# Patient Record
Sex: MALE | Race: WHITE | HISPANIC OR LATINO | Employment: FULL TIME | URBAN - METROPOLITAN AREA
[De-identification: names, ages, dates, MRNs, and addresses within clinical notes are randomized per-mention and may not be internally consistent; named-entity substitution may affect disease eponyms.]

---

## 2017-09-01 ENCOUNTER — HOSPITAL ENCOUNTER (EMERGENCY)
Facility: MEDICAL CENTER | Age: 27
End: 2017-09-01
Attending: EMERGENCY MEDICINE
Payer: OTHER MISCELLANEOUS

## 2017-09-01 ENCOUNTER — APPOINTMENT (OUTPATIENT)
Dept: RADIOLOGY | Facility: MEDICAL CENTER | Age: 27
End: 2017-09-01
Attending: EMERGENCY MEDICINE
Payer: OTHER MISCELLANEOUS

## 2017-09-01 VITALS
HEART RATE: 76 BPM | OXYGEN SATURATION: 99 % | HEIGHT: 75 IN | BODY MASS INDEX: 27.69 KG/M2 | WEIGHT: 222.66 LBS | TEMPERATURE: 97.8 F | RESPIRATION RATE: 18 BRPM | DIASTOLIC BLOOD PRESSURE: 71 MMHG | SYSTOLIC BLOOD PRESSURE: 135 MMHG

## 2017-09-01 DIAGNOSIS — F41.8 SITUATIONAL ANXIETY: ICD-10-CM

## 2017-09-01 DIAGNOSIS — S16.1XXA CERVICAL STRAIN, INITIAL ENCOUNTER: ICD-10-CM

## 2017-09-01 DIAGNOSIS — S80.02XA CONTUSION OF LEFT KNEE, INITIAL ENCOUNTER: ICD-10-CM

## 2017-09-01 DIAGNOSIS — S09.90XA CLOSED HEAD INJURY, INITIAL ENCOUNTER: ICD-10-CM
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
## 2017-09-01 PROCEDURE — 73564 X-RAY EXAM KNEE 4 OR MORE: CPT | Mod: LT

## 2017-09-01 PROCEDURE — 700102 HCHG RX REV CODE 250 W/ 637 OVERRIDE(OP): Performed by: EMERGENCY MEDICINE

## 2017-09-01 PROCEDURE — 99284 EMERGENCY DEPT VISIT MOD MDM: CPT

## 2017-09-01 PROCEDURE — 72125 CT NECK SPINE W/O DYE: CPT

## 2017-09-01 PROCEDURE — 70450 CT HEAD/BRAIN W/O DYE: CPT

## 2017-09-01 PROCEDURE — A9270 NON-COVERED ITEM OR SERVICE: HCPCS | Performed by: EMERGENCY MEDICINE

## 2017-09-01 RX ORDER — IBUPROFEN 200 MG
200-400 TABLET ORAL EVERY 6 HOURS PRN
Status: SHIPPED | COMMUNITY
End: 2018-02-07

## 2017-09-01 RX ORDER — HYDROCODONE BITARTRATE AND ACETAMINOPHEN 5; 325 MG/1; MG/1
1 TABLET ORAL ONCE
Status: COMPLETED | OUTPATIENT
Start: 2017-09-01 | End: 2017-09-01

## 2017-09-01 RX ORDER — HYDROCODONE BITARTRATE AND ACETAMINOPHEN 5; 325 MG/1; MG/1
1-2 TABLET ORAL EVERY 6 HOURS PRN
Qty: 15 TAB | Refills: 0 | Status: SHIPPED | OUTPATIENT
Start: 2017-09-01 | End: 2018-02-07

## 2017-09-01 RX ADMIN — HYDROCODONE BITARTRATE AND ACETAMINOPHEN 1 TABLET: 5; 325 TABLET ORAL at 13:51

## 2017-09-01 ASSESSMENT — PAIN SCALES - GENERAL: PAINLEVEL_OUTOF10: 5
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
## 2017-09-01 NOTE — ED NOTES
The Medication Reconciliation process has been completed by interviewing the patient    Allergies have been reviewed  Antibiotic use in 30 days - none

## 2018-02-07 ENCOUNTER — OFFICE VISIT (OUTPATIENT)
Dept: MEDICAL GROUP | Facility: MEDICAL CENTER | Age: 28
End: 2018-02-07
Payer: COMMERCIAL

## 2018-02-07 VITALS
HEIGHT: 72 IN | BODY MASS INDEX: 29.34 KG/M2 | DIASTOLIC BLOOD PRESSURE: 78 MMHG | RESPIRATION RATE: 12 BRPM | WEIGHT: 216.6 LBS | OXYGEN SATURATION: 98 % | HEART RATE: 100 BPM | TEMPERATURE: 98 F | SYSTOLIC BLOOD PRESSURE: 120 MMHG

## 2018-02-07 DIAGNOSIS — Z11.3 SCREEN FOR STD (SEXUALLY TRANSMITTED DISEASE): ICD-10-CM

## 2018-02-07 DIAGNOSIS — Z00.00 ANNUAL PHYSICAL EXAM: ICD-10-CM

## 2018-02-07 PROCEDURE — 99385 PREV VISIT NEW AGE 18-39: CPT | Performed by: NURSE PRACTITIONER

## 2018-02-07 ASSESSMENT — PATIENT HEALTH QUESTIONNAIRE - PHQ9: CLINICAL INTERPRETATION OF PHQ2 SCORE: 0
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
## 2018-02-10 ENCOUNTER — HOSPITAL ENCOUNTER (OUTPATIENT)
Dept: LAB | Facility: MEDICAL CENTER | Age: 28
End: 2018-02-10
Attending: NURSE PRACTITIONER
Payer: COMMERCIAL

## 2018-02-10 DIAGNOSIS — Z11.3 SCREEN FOR STD (SEXUALLY TRANSMITTED DISEASE): ICD-10-CM

## 2018-02-10 DIAGNOSIS — Z00.00 ANNUAL PHYSICAL EXAM: ICD-10-CM

## 2018-02-10 LAB
ALBUMIN SERPL BCP-MCNC: 4.4 G/DL (ref 3.2–4.9)
ALBUMIN/GLOB SERPL: 1.3 G/DL
ALP SERPL-CCNC: 36 U/L (ref 30–99)
ALT SERPL-CCNC: 15 U/L (ref 2–50)
ANION GAP SERPL CALC-SCNC: 8 MMOL/L (ref 0–11.9)
AST SERPL-CCNC: 22 U/L (ref 12–45)
BILIRUB SERPL-MCNC: 0.6 MG/DL (ref 0.1–1.5)
BUN SERPL-MCNC: 10 MG/DL (ref 8–22)
C TRACH DNA SPEC QL NAA+PROBE: NEGATIVE
CALCIUM SERPL-MCNC: 9.7 MG/DL (ref 8.5–10.5)
CHLORIDE SERPL-SCNC: 106 MMOL/L (ref 96–112)
CHOLEST SERPL-MCNC: 186 MG/DL (ref 100–199)
CO2 SERPL-SCNC: 25 MMOL/L (ref 20–33)
CREAT SERPL-MCNC: 0.88 MG/DL (ref 0.5–1.4)
GLOBULIN SER CALC-MCNC: 3.3 G/DL (ref 1.9–3.5)
GLUCOSE SERPL-MCNC: 94 MG/DL (ref 65–99)
HCV AB SER QL: NEGATIVE
HDLC SERPL-MCNC: 51 MG/DL
LDLC SERPL CALC-MCNC: 124 MG/DL
N GONORRHOEA DNA SPEC QL NAA+PROBE: NEGATIVE
POTASSIUM SERPL-SCNC: 4.2 MMOL/L (ref 3.6–5.5)
PROT SERPL-MCNC: 7.7 G/DL (ref 6–8.2)
SODIUM SERPL-SCNC: 139 MMOL/L (ref 135–145)
SPECIMEN SOURCE: NORMAL
TRIGL SERPL-MCNC: 54 MG/DL (ref 0–149)

## 2018-02-10 PROCEDURE — 80053 COMPREHEN METABOLIC PANEL: CPT

## 2018-02-10 PROCEDURE — 87591 N.GONORRHOEAE DNA AMP PROB: CPT

## 2018-02-10 PROCEDURE — 86803 HEPATITIS C AB TEST: CPT

## 2018-02-10 PROCEDURE — 80061 LIPID PANEL: CPT

## 2018-02-10 PROCEDURE — 36415 COLL VENOUS BLD VENIPUNCTURE: CPT

## 2018-02-10 PROCEDURE — 87491 CHLMYD TRACH DNA AMP PROBE: CPT

## 2019-03-13 ENCOUNTER — OFFICE VISIT (OUTPATIENT)
Dept: MEDICAL GROUP | Facility: MEDICAL CENTER | Age: 29
End: 2019-03-13
Payer: COMMERCIAL

## 2019-03-13 ENCOUNTER — APPOINTMENT (OUTPATIENT)
Dept: LAB | Facility: MEDICAL CENTER | Age: 29
End: 2019-03-13
Attending: NURSE PRACTITIONER
Payer: COMMERCIAL

## 2019-03-13 VITALS
TEMPERATURE: 96.2 F | SYSTOLIC BLOOD PRESSURE: 132 MMHG | RESPIRATION RATE: 16 BRPM | OXYGEN SATURATION: 94 % | WEIGHT: 234 LBS | BODY MASS INDEX: 31.69 KG/M2 | DIASTOLIC BLOOD PRESSURE: 90 MMHG | HEART RATE: 122 BPM | HEIGHT: 72 IN

## 2019-03-13 DIAGNOSIS — J11.1 INFLUENZA-LIKE ILLNESS: ICD-10-CM

## 2019-03-13 DIAGNOSIS — Z83.49 FAMILY HISTORY THYROID DISEASE IN BROTHER: ICD-10-CM

## 2019-03-13 LAB
FLUAV+FLUBV AG SPEC QL IA: NEGATIVE
INT CON NEG: NEGATIVE
INT CON POS: POSITIVE

## 2019-03-13 PROCEDURE — 87804 INFLUENZA ASSAY W/OPTIC: CPT | Performed by: NURSE PRACTITIONER

## 2019-03-13 PROCEDURE — 99214 OFFICE O/P EST MOD 30 MIN: CPT | Performed by: NURSE PRACTITIONER

## 2019-03-13 ASSESSMENT — PATIENT HEALTH QUESTIONNAIRE - PHQ9: CLINICAL INTERPRETATION OF PHQ2 SCORE: 0

## 2019-03-13 NOTE — PROGRESS NOTES
Subjective:     Chief Complaint   Patient presents with   • Pharyngitis     X3 DAYS    • Chills     SHIVERING AT NIGHT    • Other     DOCTORS NOTE FOR WORK      Ector Rodriguez is a 28 y.o. male here for evaluation of sore throat, chills, cough, congestion. Symptoms started 2 days ago, he has had chills nightly and productive cough. Throat hurts with swallowing, appetite is decreased. He is missing work.  No sob, otalgia, nausea, vomiting, rash. He is taking nyquil with short term relief    Brother recently diagnosed with hypothyroid, requesting screening. No weight change, fatigue, hair loss, heat or cold intolerance    No problem-specific Assessment & Plan notes found for this encounter.       Current medicines (including changes today)  No current outpatient prescriptions on file.     No current facility-administered medications for this visit.      He  has no past medical history of Asthma; Diabetes (HCC); Hyperlipidemia; or Hypertension.    ROS included above     Objective:     Blood pressure 132/90, pulse (!) 122, temperature (!) 35.7 °C (96.2 °F), temperature source Temporal, resp. rate 16, height 1.829 m (6'), weight 106.1 kg (234 lb), SpO2 94 %. Body mass index is 31.74 kg/m².     Physical Exam:  General: Alert, oriented in no acute distress.  Eye contact is good, speech is normal, affect calm  HEENT: posterior pharynx with mild erythema, no tonsillar enlargement, no lesions. TMs gray with good landmarks bilaterally. No lymphadenopathy.  Lungs: clear to auscultation bilaterally, normal effort, no wheeze/ rhonchi/ rales.  CV: regular rate and rhythm, S1, S2, no murmur  Ext: no edema, color normal, vascularity normal, temperature normal    Assessment and Plan:   The following treatment plan was discussed   1. Influenza-like illness  Negative for influenza. Well appearing in the office. Course of viral illness reviewed. Symptomatic treatment discussed including increasing fluids, rest, humidity, OTC analgesic  as needed.  Letter provided to excuse work absence  POCT Influenza A/B   2. Family history thyroid disease in brother  TSH WITH REFLEX TO FT4       Followup: as needed       Please note that this dictation was created using voice recognition software. I have worked with consultants from the vendor as well as technical experts from [facility] to optimize the interface. I have made every reasonable attempt to correct obvious errors, but I expect that there are errors of grammar and possibly content that I did not discover before finalizing the note.

## 2019-03-13 NOTE — LETTER
March 13, 2019        RE: Ector Rodriguez    To whom it may concern;    Ector Rodriguez was seen today in my office for medical appointment.  Please excuse his work absence 3/12/19 through 3/15/19 due to illness.    Please contact me with any questions.    Sincerely,            Nikki KULKARNI

## 2020-01-17 ENCOUNTER — OFFICE VISIT (OUTPATIENT)
Dept: MEDICAL GROUP | Facility: MEDICAL CENTER | Age: 30
End: 2020-01-17
Payer: COMMERCIAL

## 2020-01-17 VITALS
BODY MASS INDEX: 30.46 KG/M2 | OXYGEN SATURATION: 95 % | SYSTOLIC BLOOD PRESSURE: 138 MMHG | HEIGHT: 75 IN | HEART RATE: 91 BPM | DIASTOLIC BLOOD PRESSURE: 74 MMHG | WEIGHT: 245 LBS | TEMPERATURE: 99.2 F

## 2020-01-17 DIAGNOSIS — M25.569 KNEE PAIN, UNSPECIFIED CHRONICITY, UNSPECIFIED LATERALITY: ICD-10-CM

## 2020-01-17 ASSESSMENT — PATIENT HEALTH QUESTIONNAIRE - PHQ9: CLINICAL INTERPRETATION OF PHQ2 SCORE: 0

## 2020-01-21 NOTE — PROGRESS NOTES
Patient comes in today for evaluation of knee pain.  He immediately tells me this is a work-related injury.  We have discussed process for proper evaluation through Workmen's Comp., his visit today will be canceled.  He will follow-up with primary care as needed.

## 2022-06-20 ENCOUNTER — HOSPITAL ENCOUNTER (OUTPATIENT)
Facility: MEDICAL CENTER | Age: 32
End: 2022-06-20
Attending: PHYSICIAN ASSISTANT
Payer: COMMERCIAL

## 2022-06-20 ENCOUNTER — OFFICE VISIT (OUTPATIENT)
Dept: URGENT CARE | Facility: CLINIC | Age: 32
End: 2022-06-20
Payer: COMMERCIAL

## 2022-06-20 VITALS
HEART RATE: 74 BPM | DIASTOLIC BLOOD PRESSURE: 58 MMHG | WEIGHT: 180 LBS | TEMPERATURE: 98.3 F | HEIGHT: 75 IN | RESPIRATION RATE: 18 BRPM | SYSTOLIC BLOOD PRESSURE: 112 MMHG | BODY MASS INDEX: 22.38 KG/M2 | OXYGEN SATURATION: 98 %

## 2022-06-20 DIAGNOSIS — R68.89 FLU-LIKE SYMPTOMS: ICD-10-CM

## 2022-06-20 DIAGNOSIS — Z20.822 CLOSE EXPOSURE TO COVID-19 VIRUS: ICD-10-CM

## 2022-06-20 LAB
FLUAV+FLUBV AG SPEC QL IA: NEGATIVE
INT CON NEG: NORMAL
INT CON POS: NORMAL

## 2022-06-20 PROCEDURE — 87804 INFLUENZA ASSAY W/OPTIC: CPT | Performed by: PHYSICIAN ASSISTANT

## 2022-06-20 PROCEDURE — U0003 INFECTIOUS AGENT DETECTION BY NUCLEIC ACID (DNA OR RNA); SEVERE ACUTE RESPIRATORY SYNDROME CORONAVIRUS 2 (SARS-COV-2) (CORONAVIRUS DISEASE [COVID-19]), AMPLIFIED PROBE TECHNIQUE, MAKING USE OF HIGH THROUGHPUT TECHNOLOGIES AS DESCRIBED BY CMS-2020-01-R: HCPCS

## 2022-06-20 PROCEDURE — U0005 INFEC AGEN DETEC AMPLI PROBE: HCPCS

## 2022-06-20 PROCEDURE — 99213 OFFICE O/P EST LOW 20 MIN: CPT | Mod: CS | Performed by: PHYSICIAN ASSISTANT

## 2022-06-20 ASSESSMENT — ENCOUNTER SYMPTOMS
FEVER: 1
DIARRHEA: 1

## 2022-06-20 NOTE — PROGRESS NOTES
"  Subjective:   Ector Rodriguez is a 31 y.o. male who presents today with   Chief Complaint   Patient presents with   • Coronavirus Screening     X3-4 days, SOB, fever, fatigue runny nose, diarrhea      Fever   This is a new problem. Episode onset: 3 days. The problem occurs constantly. The problem has been unchanged. Maximum temperature: subjective. Associated symptoms include diarrhea. Treatments tried: Advil cold and sinus. The treatment provided mild relief.   At home COVID negative.  Patient's fiancé tested positive for COVID.  PMH:  has no past medical history of Asthma, Diabetes (HCC), Hyperlipidemia, or Hypertension.  MEDS: No current outpatient medications on file.  ALLERGIES: No Known Allergies  SURGHX: History reviewed. No pertinent surgical history.  SOCHX:  reports that he has never smoked. He has never used smokeless tobacco. He reports current alcohol use. He reports that he does not use drugs.  FH: Reviewed with patient, not pertinent to this visit.     Review of Systems   Constitutional: Positive for fever and malaise/fatigue.   HENT:        Runny nose   Gastrointestinal: Positive for diarrhea.      Objective:   /58   Pulse 74   Temp 36.8 °C (98.3 °F) (Temporal)   Resp 18   Ht 1.905 m (6' 3\")   Wt 81.6 kg (180 lb)   SpO2 98%   BMI 22.50 kg/m²   Physical Exam  Vitals and nursing note reviewed.   Constitutional:       General: He is not in acute distress.     Appearance: Normal appearance. He is well-developed. He is not ill-appearing or toxic-appearing.   HENT:      Head: Normocephalic and atraumatic.      Right Ear: Hearing normal.      Left Ear: Hearing normal.   Eyes:      Pupils: Pupils are equal, round, and reactive to light.   Cardiovascular:      Rate and Rhythm: Normal rate.   Pulmonary:      Effort: Pulmonary effort is normal.   Musculoskeletal:      Comments: Normal movement in all 4 extremities   Skin:     General: Skin is warm and dry.   Neurological:      Mental " Status: He is alert.      Coordination: Coordination normal.     FLU -    Assessment/Plan:   Assessment    1. Flu-like symptoms  - POCT Influenza A/B  - SARS-CoV-2 PCR (24 hour In-House): Collect NP swab in VTM; Future    2. Close exposure to COVID-19 virus  Symptoms and presentation consistent with viral illness and we will rule out COVID at this time.  Vital signs are stable on exam today.  Discussed CDC guidelines including self isolation at home.   Patient encouraged to get plenty of rest, use OTC tylenol for pain/fever, and drink plenty of fluids.  Differential diagnosis, natural history, supportive care, and indications for immediate follow-up discussed.   Patient given instructions and understanding of medications and treatment.    If not improving in 3-5 days, F/U with PCP or return to  if symptoms worsen.    Patient agreeable to plan.      Please note that this dictation was created using voice recognition software. I have made every reasonable attempt to correct obvious errors, but I expect that there are errors of grammar and possibly content that I did not discover before finalizing the note.    Pool Monge PA-C

## 2022-06-21 DIAGNOSIS — R68.89 FLU-LIKE SYMPTOMS: ICD-10-CM

## 2022-06-21 LAB
COVID ORDER STATUS COVID19: NORMAL
SARS-COV-2 RNA RESP QL NAA+PROBE: NOTDETECTED
SPECIMEN SOURCE: NORMAL

## 2022-10-31 ENCOUNTER — OFFICE VISIT (OUTPATIENT)
Dept: MEDICAL GROUP | Facility: MEDICAL CENTER | Age: 32
End: 2022-10-31
Payer: COMMERCIAL

## 2022-10-31 ENCOUNTER — HOSPITAL ENCOUNTER (OUTPATIENT)
Dept: LAB | Facility: MEDICAL CENTER | Age: 32
End: 2022-10-31
Attending: STUDENT IN AN ORGANIZED HEALTH CARE EDUCATION/TRAINING PROGRAM
Payer: COMMERCIAL

## 2022-10-31 VITALS
SYSTOLIC BLOOD PRESSURE: 120 MMHG | TEMPERATURE: 98.1 F | HEART RATE: 103 BPM | BODY MASS INDEX: 32.82 KG/M2 | DIASTOLIC BLOOD PRESSURE: 72 MMHG | HEIGHT: 74 IN | RESPIRATION RATE: 16 BRPM | WEIGHT: 255.73 LBS | OXYGEN SATURATION: 99 %

## 2022-10-31 DIAGNOSIS — E66.09 CLASS 1 OBESITY DUE TO EXCESS CALORIES WITHOUT SERIOUS COMORBIDITY WITH BODY MASS INDEX (BMI) OF 32.0 TO 32.9 IN ADULT: ICD-10-CM

## 2022-10-31 DIAGNOSIS — M25.562 CHRONIC PAIN OF LEFT KNEE: ICD-10-CM

## 2022-10-31 DIAGNOSIS — Z00.00 ENCOUNTER FOR MEDICAL EXAMINATION TO ESTABLISH CARE: ICD-10-CM

## 2022-10-31 DIAGNOSIS — R41.840 POOR CONCENTRATION: ICD-10-CM

## 2022-10-31 DIAGNOSIS — H53.8 BLURRY VISION, LEFT EYE: ICD-10-CM

## 2022-10-31 DIAGNOSIS — G89.29 CHRONIC PAIN OF LEFT KNEE: ICD-10-CM

## 2022-10-31 PROBLEM — J11.1 INFLUENZA-LIKE ILLNESS: Status: RESOLVED | Noted: 2019-03-13 | Resolved: 2022-10-31

## 2022-10-31 PROBLEM — E66.811 CLASS 1 OBESITY DUE TO EXCESS CALORIES WITHOUT SERIOUS COMORBIDITY WITH BODY MASS INDEX (BMI) OF 32.0 TO 32.9 IN ADULT: Status: ACTIVE | Noted: 2022-10-31

## 2022-10-31 LAB
ALBUMIN SERPL BCP-MCNC: 4.7 G/DL (ref 3.2–4.9)
ALBUMIN/GLOB SERPL: 1.4 G/DL
ALP SERPL-CCNC: 67 U/L (ref 30–99)
ALT SERPL-CCNC: 27 U/L (ref 2–50)
ANION GAP SERPL CALC-SCNC: 12 MMOL/L (ref 7–16)
AST SERPL-CCNC: 24 U/L (ref 12–45)
BILIRUB SERPL-MCNC: 0.2 MG/DL (ref 0.1–1.5)
BUN SERPL-MCNC: 13 MG/DL (ref 8–22)
CALCIUM SERPL-MCNC: 9.9 MG/DL (ref 8.4–10.2)
CHLORIDE SERPL-SCNC: 103 MMOL/L (ref 96–112)
CHOLEST SERPL-MCNC: 212 MG/DL (ref 100–199)
CO2 SERPL-SCNC: 25 MMOL/L (ref 20–33)
CREAT SERPL-MCNC: 0.93 MG/DL (ref 0.5–1.4)
EST. AVERAGE GLUCOSE BLD GHB EST-MCNC: 117 MG/DL
GFR SERPLBLD CREATININE-BSD FMLA CKD-EPI: 112 ML/MIN/1.73 M 2
GLOBULIN SER CALC-MCNC: 3.4 G/DL (ref 1.9–3.5)
GLUCOSE SERPL-MCNC: 94 MG/DL (ref 65–99)
HBA1C MFR BLD: 5.7 % (ref 4–5.6)
HDLC SERPL-MCNC: 44 MG/DL
LDLC SERPL CALC-MCNC: 130 MG/DL
POTASSIUM SERPL-SCNC: 4.7 MMOL/L (ref 3.6–5.5)
PROT SERPL-MCNC: 8.1 G/DL (ref 6–8.2)
SODIUM SERPL-SCNC: 140 MMOL/L (ref 135–145)
TRIGL SERPL-MCNC: 192 MG/DL (ref 0–149)

## 2022-10-31 PROCEDURE — 83036 HEMOGLOBIN GLYCOSYLATED A1C: CPT

## 2022-10-31 PROCEDURE — 80053 COMPREHEN METABOLIC PANEL: CPT

## 2022-10-31 PROCEDURE — 36415 COLL VENOUS BLD VENIPUNCTURE: CPT

## 2022-10-31 PROCEDURE — 99214 OFFICE O/P EST MOD 30 MIN: CPT | Performed by: STUDENT IN AN ORGANIZED HEALTH CARE EDUCATION/TRAINING PROGRAM

## 2022-10-31 PROCEDURE — 80061 LIPID PANEL: CPT

## 2022-10-31 ASSESSMENT — PATIENT HEALTH QUESTIONNAIRE - PHQ9: CLINICAL INTERPRETATION OF PHQ2 SCORE: 0

## 2022-10-31 NOTE — PROGRESS NOTES
"Subjective:     CC:  Diagnoses of Encounter for medical examination to establish care, Class 1 obesity due to excess calories without serious comorbidity with body mass index (BMI) of 32.0 to 32.9 in adult, Chronic pain of left knee, Blurry vision, left eye, and Poor concentration were pertinent to this visit.    HISTORY OF THE PRESENT ILLNESS: Patient is a 32 y.o. male. This pleasant patient is here today to establish care and discuss the following.    Problem   Class 1 Obesity Due to Excess Calories Without Serious Comorbidity With Body Mass Index (Bmi) of 32.0 to 32.9 in Adult   Left Knee Pain    Chronic left knee pain.  Patient has not tried physical therapy has tried a chiropractor.  He is not interested in surgical evaluation a this time.     Poor Concentration    Patient has recently noted poor concentration.  He denies any difficulty sleeping.  He denies any excessive daytime sleepiness.  He denies having ADD or ADHD as a child.  The symptoms have appeared only recently.     Blurry Vision, Left Eye    Patient notes recent blurriness in the left eye that clears when he blinks and focuses.  He has not seen an eye doctor apart from getting glasses at the mall.     Influenza-Like Illness (Resolved)       Health Maintenance: Completed    ROS:   ROS      Objective:     Exam: /72 (BP Location: Left arm, Patient Position: Sitting, BP Cuff Size: Adult)   Pulse (!) 103   Temp 36.7 °C (98.1 °F) (Temporal)   Resp 16   Ht 1.88 m (6' 2.02\")   Wt 116 kg (255 lb 11.7 oz)   SpO2 99%  Body mass index is 32.82 kg/m².    Physical Exam  Constitutional:       General: He is not in acute distress.     Appearance: He is not toxic-appearing.   HENT:      Head: Normocephalic and atraumatic.      Right Ear: External ear normal.      Left Ear: External ear normal.   Eyes:      General:         Right eye: No discharge.         Left eye: No discharge.      Extraocular Movements: Extraocular movements intact.      " Conjunctiva/sclera: Conjunctivae normal.   Cardiovascular:      Rate and Rhythm: Normal rate and regular rhythm.      Pulses: Normal pulses.      Heart sounds: Normal heart sounds. No murmur heard.  Pulmonary:      Effort: Pulmonary effort is normal. No respiratory distress.      Breath sounds: Normal breath sounds. No wheezing.   Abdominal:      General: Abdomen is flat. Bowel sounds are normal. There is no distension.      Palpations: Abdomen is soft.      Tenderness: There is no abdominal tenderness.   Musculoskeletal:      Cervical back: Normal range of motion.   Skin:     General: Skin is warm and dry.      Capillary Refill: Capillary refill takes less than 2 seconds.   Neurological:      Mental Status: He is alert.   Psychiatric:         Mood and Affect: Mood normal.         Behavior: Behavior normal.         Thought Content: Thought content normal.         Judgment: Judgment normal.         Assessment & Plan:   32 y.o. male with the following -    1. Encounter for medical examination to establish care  Medical history, problem list, medications and allergies reviewed    2. Class 1 obesity due to excess calories without serious comorbidity with body mass index (BMI) of 32.0 to 32.9 in adult  Labs as below  - Comp Metabolic Panel; Future  - Lipid Profile; Future  - HEMOGLOBIN A1C; Future    3. Chronic pain of left knee  Referral to physiatry  - Referral to Pain Clinic    4. Blurry vision, left eye  Unclear cause of vision changes, referral to ophthalmology placed  - Referral to Ophthalmology    5. Poor concentration  This does not sound like ADHD given the late presentation.  Low concern for sleep disturbance as a cause given patient history.  If it does get worse and he is interested in starting medication we will send him to psychiatry for formal evaluation for ADHD.      Return in about 2 weeks (around 11/14/2022).    Please note that this dictation was created using voice recognition software. I have made  every reasonable attempt to correct obvious errors, but I expect that there are errors of grammar and possibly content that I did not discover before finalizing the note.

## 2022-12-15 ENCOUNTER — OFFICE VISIT (OUTPATIENT)
Dept: PHYSICAL MEDICINE AND REHAB | Facility: MEDICAL CENTER | Age: 32
End: 2022-12-15
Payer: COMMERCIAL

## 2022-12-15 VITALS
SYSTOLIC BLOOD PRESSURE: 128 MMHG | WEIGHT: 259.92 LBS | HEART RATE: 81 BPM | TEMPERATURE: 98.1 F | OXYGEN SATURATION: 97 % | HEIGHT: 75 IN | BODY MASS INDEX: 32.32 KG/M2 | DIASTOLIC BLOOD PRESSURE: 84 MMHG

## 2022-12-15 DIAGNOSIS — G89.29 CHRONIC PAIN OF LEFT KNEE: ICD-10-CM

## 2022-12-15 DIAGNOSIS — M25.562 CHRONIC PAIN OF LEFT KNEE: ICD-10-CM

## 2022-12-15 DIAGNOSIS — M25.462 EFFUSION OF BURSA OF LEFT KNEE: ICD-10-CM

## 2022-12-15 PROCEDURE — 76882 US LMTD JT/FCL EVL NVASC XTR: CPT | Performed by: PHYSICAL MEDICINE & REHABILITATION

## 2022-12-15 PROCEDURE — 99204 OFFICE O/P NEW MOD 45 MIN: CPT | Mod: 25 | Performed by: PHYSICAL MEDICINE & REHABILITATION

## 2022-12-15 RX ORDER — MELOXICAM 15 MG/1
15 TABLET ORAL
Qty: 60 TABLET | Refills: 0 | Status: SHIPPED | OUTPATIENT
Start: 2022-12-15 | End: 2023-02-06

## 2022-12-15 ASSESSMENT — PATIENT HEALTH QUESTIONNAIRE - PHQ9: CLINICAL INTERPRETATION OF PHQ2 SCORE: 0

## 2022-12-15 ASSESSMENT — PAIN SCALES - GENERAL: PAINLEVEL: 6=MODERATE PAIN

## 2022-12-15 NOTE — PROGRESS NOTES
New patient note    Interventional spine and Pain  Physiatry (physical medicine and  Rehabilitation)     Date of service: See epic    Chief complaint:   Chief Complaint   Patient presents with    New Patient      Chronic pain of left knee        Referring provider: Karina Loja M.D.     HISTORY    HPI: Ector Rodriguez 32 y.o.  who presents today with Diagnoses of Chronic pain of left knee and Effusion of bursa of left knee were pertinent to this visit.    Knee Pain  This is a chronic problem. Episode onset: 5 years. The problem occurs constantly. The problem has been gradually worsening. The symptoms are aggravated by twisting and bending (kneeling). He has tried NSAIDs, acetaminophen and walking (home exercise program.) for the symptoms. The treatment provided no relief.   6/10 in intensity.  Nonradiating.  Denies numbness tingling or weakness.     He went to physical therapy last year.  He also has a provider driven home exercise program.  He has done the combined program including the past year and continues to have pain and functional deficits.        Medical records review:  I reviewed the note from the referring provider Karina Loja M.D. including the note dated 10/31/2022  Chronic pain of left knee.  Patient was also seen for obesity with labs ordered and blurred vision referred ophthalmology.  No imaging was ordered of the knee..           ROS:   Red Flags ROS:   Fever, Chills, Sweats: Denies  Involuntary Weight Loss: Denies  Bladder Incontinence: Denies  Bowel Incontinence: denies  Saddle Anesthesia: Denies    All other systems reviewed and negative.       PMHx:   History reviewed. No pertinent past medical history.      No current outpatient medications on file prior to visit.     No current facility-administered medications on file prior to visit.        PSHx:   History reviewed. No pertinent surgical history.    Family history   Family History   Problem Relation Age of Onset     "Breast Cancer Maternal Aunt     Ovarian Cancer Neg Hx     Tubal Cancer Neg Hx     Colorectal Cancer Neg Hx     Peritoneal Cancer Neg Hx          Medications: reviewed on epic.   Outpatient Medications Marked as Taking for the 12/15/22 encounter (Office Visit) with Yemi Zamora M.D.   Medication Sig Dispense Refill    meloxicam (MOBIC) 15 MG tablet Take 1 Tablet by mouth 1 time a day as needed (pain). Do not take other NSAIDs. No refills. 60 Tablet 0        Allergies:   No Known Allergies    Social Hx:   Social History     Socioeconomic History    Marital status: Single     Spouse name: Not on file    Number of children: Not on file    Years of education: Not on file    Highest education level: Not on file   Occupational History    Not on file   Tobacco Use    Smoking status: Never    Smokeless tobacco: Never   Vaping Use    Vaping Use: Never used   Substance and Sexual Activity    Alcohol use: Yes     Alcohol/week: 3.6 oz     Types: 6 Cans of beer per week     Comment: occ    Drug use: No    Sexual activity: Not Currently   Other Topics Concern    Not on file   Social History Narrative    Not on file     Social Determinants of Health     Financial Resource Strain: Not on file   Food Insecurity: Not on file   Transportation Needs: Not on file   Physical Activity: Not on file   Stress: Not on file   Social Connections: Not on file   Intimate Partner Violence: Not on file   Housing Stability: Not on file         EXAMINATION     Physical Exam:   Vitals: /84 (BP Location: Right arm, Patient Position: Sitting, BP Cuff Size: Adult)   Pulse 81   Temp 36.7 °C (98.1 °F) (Temporal)   Ht 1.905 m (6' 3\")   Wt 118 kg (259 lb 14.8 oz)   SpO2 97%     Constitutional:   Body Habitus: Body mass index is 32.49 kg/m².  Cooperation: Fully cooperates with exam  Appearance: Well-groomed, well-nourished, not disheveled     Eyes: No scleral icterus to suggest severe liver disease, no proptosis to suggest severe " hyperthyroid    ENT -no obvious auditory deficits, no obvious tongue lesions, tongue midline, no facial droop     Skin -no rashes or lesions noted     Respiratory-  breathing comfortable on room air, no audible wheezing    Cardiovascular- capillary refills less than 2 seconds.     Psychiatric- alert and oriented ×3. Normal affect.       Musculoskeletal and Neuro -     LEFT  Knee:   Inspection no swelling, rashes or deformities,   Palpation positive mild tenderness palpation of the medial and lateral joint line.  No significant tenderness to palpation throughout the knee including the  quadriceps tendon, patellar tendon, patellar, medial patellar facets, lateral patellar facets, tibial tuberosity, fibular head.  Range of motion normal range of motion in flexion and extension  Special tests:   Varus stress tests: Negative  Valgus stress tests: Negative  Lockman's test: Negative  Anterior drawer: Negative  Posterior drawer: Negative  Ayla's: Positive             Key points for the international standards for neurological classification of spinal cord injury (ISNCSCI) to light touch.     Dermatome R L                                      L2 2 2   L3 2 2   L4 2 2   L5 2 2   S1 2 2   S2 2 2       Motor Exam Lower Extremities    ? Myotome R L   Hip flexion L2 5 5   Knee extension L3 5 5   Ankle dorsiflexion L4 5 5   Toe extension L5 5 5   Ankle plantarflexion S1 5 5         MEDICAL DECISION MAKING    Medical records review: see under HPI section.     DATA    Labs:   Lab Results   Component Value Date/Time    SODIUM 140 10/31/2022 03:46 PM    POTASSIUM 4.7 10/31/2022 03:46 PM    CHLORIDE 103 10/31/2022 03:46 PM    CO2 25 10/31/2022 03:46 PM    ANION 12.0 10/31/2022 03:46 PM    GLUCOSE 94 10/31/2022 03:46 PM    BUN 13 10/31/2022 03:46 PM    CREATININE 0.93 10/31/2022 03:46 PM    CALCIUM 9.9 10/31/2022 03:46 PM    ASTSGOT 24 10/31/2022 03:46 PM    ALTSGPT 27 10/31/2022 03:46 PM    TBILIRUBIN 0.2 10/31/2022 03:46 PM     ALBUMIN 4.7 10/31/2022 03:46 PM    TOTPROTEIN 8.1 10/31/2022 03:46 PM    GLOBULIN 3.4 10/31/2022 03:46 PM    AGRATIO 1.4 10/31/2022 03:46 PM   ]    No results found for: PROTHROMBTM, INR     No results found for: WBC, RBC, HEMOGLOBIN, HEMATOCRIT, MCV, MCH, MCHC, MPV, NEUTSPOLYS, LYMPHOCYTES, MONOCYTES, EOSINOPHILS, BASOPHILS, HYPOCHROMIA, ANISOCYTOSIS     Lab Results   Component Value Date/Time    HBA1C 5.7 (H) 10/31/2022 03:46 PM        Imaging:   I personally reviewed following images, these are my reads  X-ray left knee 9/1/2017  No acute fracture.        IMAGING radiology reads. I reviewed the following radiology reads                                                                                       Results for orders placed during the hospital encounter of 09/01/17    DX-KNEE COMPLETE 4+ LEFT    Impression  No acute findings.                          Diagnosis   Visit Diagnoses     ICD-10-CM   1. Chronic pain of left knee  M25.562    G89.29   2. Effusion of bursa of left knee  M25.462           ASSESSMENT AND PLAN:  Ector Martineza 32 y.o. male      Ector was seen today for new patient.    Diagnoses and all orders for this visit:    Chronic pain of left knee  -     Referral to Physical Therapy  -     meloxicam (MOBIC) 15 MG tablet; Take 1 Tablet by mouth 1 time a day as needed (pain). Do not take other NSAIDs. No refills.    Effusion of bursa of left knee  -     Referral to Physical Therapy  -     meloxicam (MOBIC) 15 MG tablet; Take 1 Tablet by mouth 1 time a day as needed (pain). Do not take other NSAIDs. No refills.      The patient failed conservative treatment described above.  Failed NSAIDs, provider and physical therapy driven home exercise program including the past year.    Physical therapy: I ordered physical therapy to focus on strengthening and stretching.     home exercise program: I provided the patient with a strengthening and stretching with a home exercise program     Diagnostic  workup:     12/15/2022 I performed a limited diagnostic ultrasound the left knee.  The suprapatellar effusion is seen    MRI ordered as above    Medications:   As above     Interventional program: I would consider the patient for a steroid injection versus surgery depending on the results of the above           Follow-up: After the above diagnostic studies           Please note that this dictation was created using voice recognition software. I have made every reasonable attempt to correct obvious errors but there may be errors of grammar and content that I may have overlooked prior to finalization of this note.      Yemi Zamora MD  Physical Medicine and Rehabilitation  Interventional Spine and Sports Physiatry  [facility]            Karina Loja M.D.

## 2022-12-27 ENCOUNTER — APPOINTMENT (OUTPATIENT)
Dept: RADIOLOGY | Facility: MEDICAL CENTER | Age: 32
End: 2022-12-27
Attending: PHYSICAL MEDICINE & REHABILITATION
Payer: COMMERCIAL

## 2022-12-27 DIAGNOSIS — M25.562 CHRONIC PAIN OF LEFT KNEE: ICD-10-CM

## 2022-12-27 DIAGNOSIS — M25.462 EFFUSION OF BURSA OF LEFT KNEE: ICD-10-CM

## 2022-12-27 DIAGNOSIS — G89.29 CHRONIC PAIN OF LEFT KNEE: ICD-10-CM

## 2022-12-27 PROCEDURE — 73721 MRI JNT OF LWR EXTRE W/O DYE: CPT | Mod: LT

## 2023-02-06 ENCOUNTER — OFFICE VISIT (OUTPATIENT)
Dept: PHYSICAL MEDICINE AND REHAB | Facility: MEDICAL CENTER | Age: 33
End: 2023-02-06
Payer: COMMERCIAL

## 2023-02-06 ENCOUNTER — HOSPITAL ENCOUNTER (OUTPATIENT)
Dept: RADIOLOGY | Facility: MEDICAL CENTER | Age: 33
End: 2023-02-06
Attending: PHYSICAL MEDICINE & REHABILITATION
Payer: COMMERCIAL

## 2023-02-06 VITALS
HEIGHT: 75 IN | WEIGHT: 265.21 LBS | SYSTOLIC BLOOD PRESSURE: 114 MMHG | TEMPERATURE: 98.2 F | DIASTOLIC BLOOD PRESSURE: 62 MMHG | HEART RATE: 94 BPM | OXYGEN SATURATION: 99 % | BODY MASS INDEX: 32.98 KG/M2

## 2023-02-06 DIAGNOSIS — M25.462 EFFUSION OF BURSA OF LEFT KNEE: ICD-10-CM

## 2023-02-06 DIAGNOSIS — R93.6 ABNORMAL MRI, KNEE: ICD-10-CM

## 2023-02-06 DIAGNOSIS — S83.512D SPRAIN OF ANTERIOR CRUCIATE LIGAMENT OF LEFT KNEE, SUBSEQUENT ENCOUNTER: ICD-10-CM

## 2023-02-06 DIAGNOSIS — G89.29 CHRONIC PAIN OF LEFT KNEE: ICD-10-CM

## 2023-02-06 DIAGNOSIS — M25.562 CHRONIC PAIN OF LEFT KNEE: ICD-10-CM

## 2023-02-06 PROCEDURE — 99214 OFFICE O/P EST MOD 30 MIN: CPT | Performed by: PHYSICAL MEDICINE & REHABILITATION

## 2023-02-06 PROCEDURE — 73564 X-RAY EXAM KNEE 4 OR MORE: CPT | Mod: LT

## 2023-02-06 RX ORDER — MELOXICAM 15 MG/1
15 TABLET ORAL
Qty: 90 TABLET | Refills: 0 | Status: SHIPPED | OUTPATIENT
Start: 2023-02-06 | End: 2023-05-08

## 2023-02-06 ASSESSMENT — PATIENT HEALTH QUESTIONNAIRE - PHQ9: CLINICAL INTERPRETATION OF PHQ2 SCORE: 0

## 2023-02-06 ASSESSMENT — PAIN SCALES - GENERAL: PAINLEVEL: 7=MODERATE-SEVERE PAIN

## 2023-02-06 NOTE — PROGRESS NOTES
Follow up patient note  Interventional spine and Pain  Physiatry (physical medicine and  Rehabilitation)       Chief complaint:   Chief Complaint   Patient presents with    Follow-Up     Knee pain          HISTORY    Please see new patient note by Dr Zamora,  for more details.     HPI  Patient identification: Ector Rodriguez ,  1990,   With Diagnoses of Chronic pain of left knee, Sprain of anterior cruciate ligament of left knee, subsequent encounter, Abnormal MRI, knee.  Radiologist noted metal artifact in the area of pain on the lateral aspect of the left knee., and Effusion of bursa of left knee were pertinent to this visit.       Chronic left knee pain.  7 out of 10 intensity. Worse in the lateral aspect of the knee.  Aching in quality.  Nonradiating.  Denies numbness or tingling.    He went to physical therapy last year.  He also has a provider driven home exercise program.  He has done the combined program including the past year and continues to have pain and functional deficits.         ROS Red Flags :   Fever, Chills, Sweats: Denies  Involuntary Weight Loss: Denies  Bowel/Bladder Incontinence: Denies  Saddle Anesthesia: Denies        PMHx:   History reviewed. No pertinent past medical history.    PSHx:   History reviewed. No pertinent surgical history.    Family history   Family History   Problem Relation Age of Onset    Breast Cancer Maternal Aunt     Ovarian Cancer Neg Hx     Tubal Cancer Neg Hx     Colorectal Cancer Neg Hx     Peritoneal Cancer Neg Hx          Medications:   Outpatient Medications Marked as Taking for the 23 encounter (Office Visit) with Yemi Zamora M.D.   Medication Sig Dispense Refill    meloxicam (MOBIC) 15 MG tablet Take 1 Tablet by mouth 1 time a day as needed (pain). Do not take other NSAIDs. No refills. 90 Tablet 0        No current outpatient medications on file prior to visit.     No current facility-administered medications on file prior to visit.  "        Allergies:   No Known Allergies    Social Hx:   Social History     Socioeconomic History    Marital status: Single     Spouse name: Not on file    Number of children: Not on file    Years of education: Not on file    Highest education level: Not on file   Occupational History    Not on file   Tobacco Use    Smoking status: Never    Smokeless tobacco: Never   Vaping Use    Vaping Use: Never used   Substance and Sexual Activity    Alcohol use: Yes     Alcohol/week: 3.6 oz     Types: 6 Cans of beer per week     Comment: occ    Drug use: No    Sexual activity: Not Currently   Other Topics Concern    Not on file   Social History Narrative    Not on file     Social Determinants of Health     Financial Resource Strain: Not on file   Food Insecurity: Not on file   Transportation Needs: Not on file   Physical Activity: Not on file   Stress: Not on file   Social Connections: Not on file   Intimate Partner Violence: Not on file   Housing Stability: Not on file         EXAMINATION     Physical Exam:   Vitals: /62 (BP Location: Right arm, Patient Position: Sitting, BP Cuff Size: Adult)   Pulse 94   Temp 36.8 °C (98.2 °F) (Temporal)   Ht 1.905 m (6' 3\")   Wt 120 kg (265 lb 3.4 oz)   SpO2 99%     Constitutional:   Body Habitus: Body mass index is 33.15 kg/m².  Cooperation: Fully cooperates with exam  Appearance: Well-groomed no disheveled    Respiratory-  breathing comfortable on room air, no audible wheezing  Cardiovascular- capillary refills less than 2 seconds. No lower extremity edema is noted.   Psychiatric- alert and oriented ×3. Normal affect.    MSK and Neuro: -    LEFT  Knee:   Inspection no swelling, rashes or deformities,   Palpation positive mild tenderness palpation of the medial and lateral joint line.  No significant tenderness to palpation throughout the knee including the  quadriceps tendon, patellar tendon, patellar, medial patellar facets, lateral patellar facets, tibial tuberosity, fibular " head.  Range of motion normal range of motion in flexion and extension  Special tests:   Varus stress tests: Negative  Valgus stress tests: Negative  Lockman's test: Negative  Anterior drawer: Negative  Posterior drawer: Negative  Ayla's: Positive    MEDICAL DECISION MAKING    DATA    Labs:   Lab Results   Component Value Date/Time    SODIUM 140 10/31/2022 03:46 PM    POTASSIUM 4.7 10/31/2022 03:46 PM    CHLORIDE 103 10/31/2022 03:46 PM    CO2 25 10/31/2022 03:46 PM    GLUCOSE 94 10/31/2022 03:46 PM    BUN 13 10/31/2022 03:46 PM    CREATININE 0.93 10/31/2022 03:46 PM        No results found for: PROTHROMBTM, INR     No results found for: WBC, RBC, HEMOGLOBIN, HEMATOCRIT, MCV, MCH, MCHC, MPV, NEUTSPOLYS, LYMPHOCYTES, MONOCYTES, EOSINOPHILS, BASOPHILS, HYPOCHROMIA, ANISOCYTOSIS     Lab Results   Component Value Date/Time    HBA1C 5.7 (H) 10/31/2022 03:46 PM          Imaging:   I personally reviewed following images    MRI left knee 12/27/2022  Metallic type artifact over the area of pain at the lateral joint line.        I reviewed the following radiology reports           MRI left knee 12/27/2022  IMPRESSION:     1.  Chronic sprain of the anterior cruciate ligament with early mucoid degeneration.     2.  Intact menisci.     3.  Metallic artifact within the soft tissues anterior to the knee in the area of the lateral patellar retinaculum possibly representing prior surgical procedure in that area.     4.  No evidence of osseous or cartilaginous injury.                                   Results for orders placed during the hospital encounter of 09/01/17    DX-KNEE COMPLETE 4+ LEFT    Impression  No acute findings.                          DIAGNOSIS   Visit Diagnoses     ICD-10-CM   1. Chronic pain of left knee  M25.562    G89.29   2. Sprain of anterior cruciate ligament of left knee, subsequent encounter  S83.512D   3. Abnormal MRI, knee.  Radiologist noted metal artifact in the area of pain on the lateral aspect  of the left knee.  R93.6   4. Effusion of bursa of left knee  M25.462         ASSESSMENT and PLAN:     Ector Rodriguez  1990 male      Ector was seen today for follow-up.    Diagnoses and all orders for this visit:    Chronic pain of left knee  -     Referral to Orthopedics  -     DX-KNEE COMPLETE 4+ LEFT  -     meloxicam (MOBIC) 15 MG tablet; Take 1 Tablet by mouth 1 time a day as needed (pain). Do not take other NSAIDs. No refills.    Sprain of anterior cruciate ligament of left knee, subsequent encounter  -     Referral to Orthopedics  -     DX-KNEE COMPLETE 4+ LEFT  -     meloxicam (MOBIC) 15 MG tablet; Take 1 Tablet by mouth 1 time a day as needed (pain). Do not take other NSAIDs. No refills.    Abnormal MRI, knee.  Radiologist noted metal artifact in the area of pain on the lateral aspect of the left knee.  -     Referral to Orthopedics  -     DX-KNEE COMPLETE 4+ LEFT    Effusion of bursa of left knee  -     Referral to Orthopedics  -     DX-KNEE COMPLETE 4+ LEFT  -     meloxicam (MOBIC) 15 MG tablet; Take 1 Tablet by mouth 1 time a day as needed (pain). Do not take other NSAIDs. No refills.          I reviewed the MRI of the left knee with the patient today.  He denies surgery in the past. He denies penetrating injuries.  He denies gunshot wound or other metallic type injury.  Previous x-rays were done in 2017 which did not show any radiopaque densities in the lateral aspect of the left knee.  I ordered new x-rays for further evaluation.  I also refer the patient to orthopedics for further evaluation.  I previously referred the patient to physical therapy.  We will hold off on physical therapy until the patient can be evaluated by orthopedics first.    Follow up: after the above consultation    Thank you for allowing me to participate in the care of this patient. If you have any questions please not hesitate to contact me.             Please note that this dictation was created using voice  recognition software. I have made every reasonable attempt to correct obvious errors but there may be errors of grammar and content that I may have overlooked prior to finalization of this note.      Yemi Zamora MD  Interventional Spine and Sports Physiatry  Physical Medicine and Rehabilitation  [facility]

## 2023-03-06 ENCOUNTER — PHYSICAL THERAPY (OUTPATIENT)
Dept: PHYSICAL THERAPY | Facility: MEDICAL CENTER | Age: 33
End: 2023-03-06
Attending: PHYSICAL MEDICINE & REHABILITATION
Payer: COMMERCIAL

## 2023-03-06 DIAGNOSIS — G89.29 CHRONIC PAIN OF LEFT KNEE: ICD-10-CM

## 2023-03-06 DIAGNOSIS — M25.462 EFFUSION OF BURSA OF LEFT KNEE: ICD-10-CM

## 2023-03-06 DIAGNOSIS — M25.562 CHRONIC PAIN OF LEFT KNEE: ICD-10-CM

## 2023-03-06 PROCEDURE — 97162 PT EVAL MOD COMPLEX 30 MIN: CPT

## 2023-03-06 PROCEDURE — 97110 THERAPEUTIC EXERCISES: CPT

## 2023-03-06 ASSESSMENT — ENCOUNTER SYMPTOMS
PAIN SCALE AT LOWEST: 0
PAIN SCALE AT HIGHEST: 9
PAIN SCALE: 6

## 2023-03-06 NOTE — OP THERAPY EVALUATION
"  Outpatient Physical Therapy  INITIAL EVALUATION    [facility] Outpatient Physical Therapy  [address]  [place] [address]  Phone:  [phone]  Fax:  [phone]    Date of Evaluation: 03/06/2023    Patient: Ector Rodriguez  YOB: 1990  MRN: [id number]     Referring Provider: Yemi Zamora M.D.  [address]  [address]  [place] [address]   Referring Diagnosis Chronic pain of left knee [M25.562, G89.29];Effusion of bursa of left knee [M25.462]     Time Calculation  Start time: 1632  Stop time: 1731 Time Calculation (min): 59 minutes         Chief Complaint: Knee Problem    Visit Diagnoses     ICD-10-CM   1. Chronic pain of left knee  M25.562    G89.29   2. Effusion of bursa of left knee  M25.462       Date of onset of impairment: No data found    Subjective:   History of Present Illness:     Mechanism of injury:  Patient is a 32 year old male with a PMH including: Class 1 obesity, poor concentration, L eye blurriness.    Pt presents to therapy with complaints of Per [facility] main sports note on 2/23/23, \"when he was 10 years old in [facility] he did suffer a pretty significant trauma to the left knee where it required some sort of deep suturing procedure.\" Pt reporting he fell onto gravel; had \"hole in knee\" and had stitches. No other trauma. Most recent complaints include chronic L knee pain x 5 years without clear MIGUEL; overall worsening with incr frequency of pain clementina during bending/kneeling and twisting. Pain is at lateral knee and anterior, does not radiate up or away from knee in calf. Denies n/t in BLE's. Does notice some hyperextension during walking, occasional buckling and intermittent popping. Recently had ultrasound-guided cortisone injection 2/23/23 with 40% improvement in s/s. Does have back pain history. Prev had pain and swelling at anterior knee-this has resolved.     Currently denies changes in bowel and bladder, saddle anesthesia, " significant weight changes, chills/night sweats, nausea and vomiting, and unexplained fatigue. Denies history of cancer. Pt consents to evaluation and treatment today.           Sleep disturbance:  Difficulty falling asleep (SL on L)  Pain:     Current pain ratin    At best pain ratin    At worst pain ratin    Location:  Lateral and anterior L knee    Progression:  Worsening    Pain Comments::  Aggravating: Twisting and bending/kneeling, walking, WB/standing, stairs and ladders     Relieving: sitting/resting, meloxicam-helpful to sleep  Social Support:     Lives in:  One-story house (1 step to enter)  Diagnostic Tests:     Diagnostic Tests Comments:  L knee MRI 22:  IMPRESSION:     1.  Chronic sprain of the anterior cruciate ligament with early mucoid degeneration.     2.  Intact menisci.     3.  Metallic artifact within the soft tissues anterior to the knee in the area of the lateral patellar retinaculum possibly representing prior surgical procedure in that area.     4.  No evidence of osseous or cartilaginous injury.    L knee x ray 23:     FINDINGS:     The alignment of the knee is within normal limits.  There is no definite joint effusion.  There is no evidence of displaced fracture or dislocation.  There is no focal swelling.          IMPRESSION:     Unremarkable knee series.    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  Patient Goals:     Patient goals for therapy:  Decreased pain    No past medical history on file.  No past surgical history on file.  Social History      Tobacco Use    Smoking status: Never    Smokeless tobacco: Never   Substance Use Topics    Alcohol use: Yes     Alcohol/week: 3.6 oz     Types: 6 Cans of beer per week     Comment: occ     Family and Occupational History     Socioeconomic History    Marital status: Single     Spouse name: Not on file    Number of children: Not on file    Years of education: Not on file    Highest education level: Not on file   Occupational History    Not on file       Objective     Observations   Left Knee   Negative for deformity and edema.     Additional Observation Details  Knee hyperextension in standing    With shoes doffed:  Increased WB on lateral portion of R foot-pes cavus, pes planus L    Neurological Testing     Sensation     Knee   Left Knee   Intact: Light touch    Right Knee   Intact: light touch     Palpation     Additional Palpation Details  Tenderness to L joint line lateral>medial and quads distally  No TTP quad tendon    Active Range of Motion     Lumbar   All lumbar active range of motion within functional limits  Left Knee   Flexion: 143 degrees     Right Knee   Normal active range of motion    Additional Active Range of Motion Details  L/S ROM WFL in standing with no impact on knee s/s    Hip ROM: WFL HS length and IR/ER B-assessed supine 90/90   tight hip flexors and quad m. on L -assessed via modified andree test  Hyperextension noted at B knees in standing    Strength:      Additional Strength Details  Bridge: Full ROM  SL bridge: loss to 3/4 height with visible muscle juddering at stance limb  Glut med testing in SL: 3+ L 4-/5 R    Tests       Lumbar spine (left)      Negative slump.   Lumbar spine (right)     Negative slump.     Additional Tests Details  Negative slump testing L      Therapeutic Exercises (CPT 94704):     1. pt education, re: Dr. Patterson's inserts for custom inexpensive alternative for shoes    2. pt education, re: continue 'doming' ex from chiropractics for hep    3. new hep including,  hip flexor stretch, SLR, bridge with resistance band, toy soldier, ball bridge x60 sec hold, HO provided      Therapeutic Exercise Summary: Pt performed these exercises with instruction and SPV.  Provided handout with these exercises for daily HEP.        Time-based treatments/modalities:    Physical Therapy Timed Treatment Charges  Therapeutic exercise minutes (CPT 41281): 29 minutes      Assessment, Response and Plan:   Impairments: activity intolerance, difficulty performing job, impaired physical strength, lacks appropriate home exercise program, limited ADL's and pain with function    Assessment details:  Patient is a pleasant and cooperative 32 year old male who presents to PT with 5 year chronic hx of L knee pain without clear MIGUEL. 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 No red flags reported. He had imaging done with metal artifacts observed, but not found on x-ray imaging. Exam findings suggestive of core and proximal pelvic girdle weakness, abnormalities at B feet in standing with shoes doffed, TTP at distal L quad and joint line. Assumes abnormal standing position with hyperextension at B knees and abnormalities in WB at B feet. Knee ROM, hip ROM and l/s ROM WFL.   Pt may benefit from skilled physical therapy in order to address above impairments in order to improve QOL and return to reported ADL's. Of note, pt's copay at [facility] is $100/visit; discussion today re: decreased frequency of sessions to decrease financial burden. Pt just started gym membership and will start indep workout routine and hep from PT.     Barriers to therapy:  Financial  Other barriers to therapy:  Laborious employment; chronicity of complaints; symptoms unchanged by rest  Prognosis comment:  Good/fair  Goals:   Short Term Goals:   1. Pt will be independent with written HEP.  2. Therapist to call pt in 1-2 weeks for check-in on hep.       Short term goal time span:  2-4 weeks      Long Term Goals:    1. Pt  will be independent with written HEP.  2. Pt will have a sig improvement in WOMAC score >/= MDC (eval: 25)  3. Pt will be able to navigate one flight of stairs/or ladder without increase in baseline s/s consistently at least 75% of the time or greater in order to increase workplace efficiency.  4. Pt will assume improved standing position without knee hyperextension without cuing in clinic at least 75% of the time in order to improve muscle engagement and avoid excessive fatigue with prolonged standing for ADL's.   5. Pt will be able to kneel for 30 min or greater with foam pad for work without increased knee pain at least 60% of the time to improve work efficiency.      Long term goal time span:  6-8 weeks    Plan:   Therapy options:  Physical therapy treatment to continue  Planned therapy interventions:  Neuromuscular Re-education (CPT 97113), E Stim Unattended (CPT 43524), Manual Therapy (CPT 14616), Gait Training (CPT 74420), Therapeutic Activities (CPT 75261) and Therapeutic Exercise (CPT 85416)  Frequency:  1x month  Duration in weeks:  12  Discussed with:  Patient  Plan details:  UPOC: 6/2/23    *Pt will have less frequent sessions due to high copay to decrease financial burden; POC to be extended to accommodate this plan    Functional Assessment Used  WOMAC Grand Total: 25     Referring provider co-signature:  I have reviewed this plan of care and my co-signature certifies the need for services.    Certification Period: 03/06/2023 to  6/2/23    Physician Signature: ________________________________ Date: ______________

## 2023-03-13 ENCOUNTER — APPOINTMENT (OUTPATIENT)
Dept: PHYSICAL THERAPY | Facility: MEDICAL CENTER | Age: 33
End: 2023-03-13
Attending: PHYSICAL MEDICINE & REHABILITATION
Payer: COMMERCIAL

## 2023-03-14 ENCOUNTER — TELEPHONE (OUTPATIENT)
Dept: PHYSICAL THERAPY | Facility: MEDICAL CENTER | Age: 33
End: 2023-03-14
Payer: COMMERCIAL

## 2023-03-20 ENCOUNTER — APPOINTMENT (OUTPATIENT)
Dept: PHYSICAL THERAPY | Facility: MEDICAL CENTER | Age: 33
End: 2023-03-20
Attending: PHYSICAL MEDICINE & REHABILITATION
Payer: COMMERCIAL

## 2023-03-27 ENCOUNTER — APPOINTMENT (OUTPATIENT)
Dept: PHYSICAL THERAPY | Facility: MEDICAL CENTER | Age: 33
End: 2023-03-27
Attending: PHYSICAL MEDICINE & REHABILITATION
Payer: COMMERCIAL

## 2023-04-03 ENCOUNTER — APPOINTMENT (OUTPATIENT)
Dept: PHYSICAL THERAPY | Facility: MEDICAL CENTER | Age: 33
End: 2023-04-03
Attending: PHYSICAL MEDICINE & REHABILITATION
Payer: COMMERCIAL

## 2023-04-10 ENCOUNTER — APPOINTMENT (OUTPATIENT)
Dept: PHYSICAL THERAPY | Facility: MEDICAL CENTER | Age: 33
End: 2023-04-10
Attending: PHYSICAL MEDICINE & REHABILITATION
Payer: COMMERCIAL

## 2023-04-17 ENCOUNTER — APPOINTMENT (OUTPATIENT)
Dept: PHYSICAL THERAPY | Facility: MEDICAL CENTER | Age: 33
End: 2023-04-17
Attending: PHYSICAL MEDICINE & REHABILITATION
Payer: COMMERCIAL

## 2023-04-24 ENCOUNTER — APPOINTMENT (OUTPATIENT)
Dept: PHYSICAL THERAPY | Facility: MEDICAL CENTER | Age: 33
End: 2023-04-24
Attending: PHYSICAL MEDICINE & REHABILITATION
Payer: COMMERCIAL

## 2023-05-01 ENCOUNTER — APPOINTMENT (OUTPATIENT)
Dept: PHYSICAL THERAPY | Facility: MEDICAL CENTER | Age: 33
End: 2023-05-01
Attending: PHYSICAL MEDICINE & REHABILITATION
Payer: COMMERCIAL

## 2023-05-07 DIAGNOSIS — M25.562 CHRONIC PAIN OF LEFT KNEE: ICD-10-CM

## 2023-05-07 DIAGNOSIS — M25.462 EFFUSION OF BURSA OF LEFT KNEE: ICD-10-CM

## 2023-05-07 DIAGNOSIS — G89.29 CHRONIC PAIN OF LEFT KNEE: ICD-10-CM

## 2023-05-07 DIAGNOSIS — S83.512D SPRAIN OF ANTERIOR CRUCIATE LIGAMENT OF LEFT KNEE, SUBSEQUENT ENCOUNTER: ICD-10-CM

## 2023-05-08 RX ORDER — MELOXICAM 15 MG/1
15 TABLET ORAL
Qty: 90 TABLET | Refills: 0 | Status: SHIPPED | OUTPATIENT
Start: 2023-05-08 | End: 2023-08-08

## 2023-05-08 NOTE — TELEPHONE ENCOUNTER
MELOXICAM 15 MG TABLET    Received request via: Pharmacy    Was the patient seen in the last year in this department? Yes    Does the patient have an active prescription (recently filled or refills available) for medication(s) requested?  Yes    Does the patient have half-way Plus and need 100 day supply (blood pressure, diabetes and cholesterol meds only)? Patient does not have SCP

## 2023-05-25 ENCOUNTER — APPOINTMENT (OUTPATIENT)
Dept: PHYSICAL THERAPY | Facility: MEDICAL CENTER | Age: 33
End: 2023-05-25
Payer: COMMERCIAL

## 2023-06-29 ENCOUNTER — PHYSICAL THERAPY (OUTPATIENT)
Dept: PHYSICAL THERAPY | Facility: MEDICAL CENTER | Age: 33
End: 2023-06-29
Attending: STUDENT IN AN ORGANIZED HEALTH CARE EDUCATION/TRAINING PROGRAM
Payer: COMMERCIAL

## 2023-06-29 DIAGNOSIS — G89.29 CHRONIC PAIN OF LEFT KNEE: ICD-10-CM

## 2023-06-29 DIAGNOSIS — M25.562 CHRONIC PAIN OF LEFT KNEE: ICD-10-CM

## 2023-06-29 PROCEDURE — 97110 THERAPEUTIC EXERCISES: CPT

## 2023-06-29 NOTE — OP THERAPY DAILY TREATMENT
Outpatient Physical Therapy  DAILY TREATMENT     [facility] Outpatient Physical Therapy  [address]  [place] [address]  Phone:  [phone]  Fax:  [phone]    Date: 06/29/2023    Patient: Ector Rodriguez  YOB: 1990  MRN: [id number]     Time Calculation    Start time: 1624  Stop time: 1702 Time Calculation (min): 38 minutes         Chief Complaint: Knee Problem    Visit #: 2    SUBJECTIVE:  Threw back out 3 weeks ago-had manip from chiropractics, which was helpful; about 60% back to normal. Is avoiding lifting heavy objects. Worse in the morning and at night. Posterior direction is improved, forward is worse. No s/s extending into LE.   Knee is better because decreased lifting at work. Improved with stretching. Is going to the gym and completing some activities.            OBJECTIVE:  Current objective measures:     See PN      Therapeutic Exercises (CPT 88876):     3. side glides to the R, 2x10, decreased s/s from 5->4/10    4. hip flexor stretch, reviewed    5. SLR, verbal review    6. resisted bridge, green TB, 2x10    8. ball bridge, attempted due to pain    9. TrA brace+marching, x10    10. clamshells, green TB, x10 ea with 3 sec hold, visible muscle juddering and fatigue without back pain, incr difficulty on R    11. LTR, x15, to R only; restricted and painful to L; hep    13. objective measures    14. goals assessment    15. review of hep      Therapeutic Exercise Summary: Access Code: [email or web address]  URL: [email or web address]  Date: 06/29/2023  Prepared by: Robert Pleitez    Exercises  - Right Standing Lateral Shift Correction at Wall - Hold  - 2 x daily - 7 x weekly - 3 sets - 10 reps  - Supine March  - 1 x daily - 7 x weekly - 2 sets - 10 reps  - Bridge with Resistance  - 4-5 x weekly - 2 sets - 10 reps  - Clam with Resistance  - 1 x daily - 4-5 x weekly - 3 sets - 10 reps  - Supine Lower Trunk Rotation  - 2 x daily - 7 x weekly - 2  sets - 10 reps    Pt performed these exercises with instruction and SPV.  Provided handout with these exercises for daily HEP.          Time-based treatments/modalities:           Pain rating (1-10) before treatment:  5/10 in standing; 8/10 in sitting   Pain rating (1-10) after treatment:    Pain Comments::  Aggravating: Twisting and bending/kneeling, walking, WB/standing, stairs and ladders   Location:  Lateral and anterior L knee    ASSESSMENT:   Response to treatment:   See PN          PLAN/RECOMMENDATIONS:   Plan for treatment: therapy treatment to continue next visit.  Planned interventions for next visit: continue with current treatment.

## 2023-06-30 NOTE — OP THERAPY PROGRESS SUMMARY
Outpatient Physical Therapy  PROGRESS SUMMARY NOTE      [facility] Outpatient Physical Therapy  [address]  [place] [address]  Phone:  [phone]  Fax:  [phone]    Date of Visit: 06/29/2023    Patient: Ector Rodriguez  YOB: 1990  MRN: [id number]     Referring Provider: Yemi Zamora M.D.  [address]  [address]  [place] [address]   Referring Diagnosis Pain in left knee [M25.562];Effusion, left knee [M25.462];Other chronic pain [G89.29]     Visit Diagnoses     ICD-10-CM   1. Chronic pain of left knee  M25.562    G89.29       Rehab Potential: good/fair    Progress Report Period: 3/6/23-6/29/23    Functional Assessment Used          Objective Findings and Assessment:   Patient progression towards goals: Pt last seen on 3/6/23 for eval only without follow ups; gap in care secondary to high PT copay and external factors such as back injury and work. He returns today reporting some improvements with twisting and bending, WB/standing; pain with walking, kneeling, stairs and ladders but cont'd pain to lesser degree. Pt stating exercises were helping until he threw out back three weeks ago. Back pain appears to worsen in sagittal plane with some improvement in frontal plane; suggesting may have lateral derangement. Updated hep with additional core strengthening to address new back complaints. Will continue to follow up with pt and emphasize core and proximal pelvic girdle strengthening as part of current POC.    Objective findings and assessment details:   L/s AROM:  Forward bending: FT to proximal shins  SB restricted to R  5/10 standing and 8/10 sitting  Lateral glide restricted B L>R  Side glides to R and LTR to R: better; decrease    Strength from eval:      Additional Strength Details  Bridge: Full ROM  SL bridge: loss to 3/4 height with visible muscle juddering at stance limb  Glut med testing: 3+ L; 4-/5 R    AROM from eval:   tight hip  flexors and quad m. on L -assessed via modified andree test  Hyperextension noted at B knees in standing      Goals:   Short Term Goals:   1. Pt will be independent with written HEP. (met)  2. Therapist to call pt in 1-2 weeks for check-in on hep. (NT)  3. Pt will be indep with initial core stab program to address acute back pain (NEW)    Short term goal time span:  2-4 weeks      Long Term Goals:    1. Pt will be independent with written HEP. (Not met)  2. Pt will have a sig improvement in WOMAC score >/= MDC (eval: 25) (NT)  3. Pt will be able to navigate one flight of stairs/or ladder without increase in baseline s/s consistently at least 75% of the time or greater in order to increase workplace efficiency. (ongoing)  4. Pt will assume improved standing position without knee hyperextension without cuing in clinic at least 75% of the time in order to improve muscle engagement and avoid excessive fatigue with prolonged standing for ADL's. (Ongoing)  5. Pt will be able to kneel for 30 min or greater with foam pad for work without increased knee pain at least 60% of the time to improve work efficiency. (ongoing)   6. Pt will demo correct hip hinge mechanics without incr in back pain consistently (NEW)    Long term goal time span:  2-4 months    Plan:   Planned therapy interventions:  Neuromuscular Re-education (CPT 85131), Gait Training (CPT 49987), Mechanical Traction (CPT 04439), Manual Therapy (CPT 35796) and Therapeutic Exercise (CPT 53805)  Frequency:  1x month  Duration in weeks:  12  Plan details:  UPOC: 9/22/23    *Pt will have less frequent sessions due to high copay to decrease financial burden; POC to be extended to accommodate this plan      Referring provider co-signature:  I have reviewed this plan of care and my co-signature certifies the need for services.     Certification Period: 06/29/2023 to 9/22/23    Physician Signature: ________________________________ Date: ______________

## 2024-07-09 ENCOUNTER — TELEPHONE (OUTPATIENT)
Dept: PHYSICAL THERAPY | Facility: MEDICAL CENTER | Age: 34
End: 2024-07-09
Payer: COMMERCIAL

## 2025-07-17 ENCOUNTER — HOSPITAL ENCOUNTER (EMERGENCY)
Facility: MEDICAL CENTER | Age: 35
End: 2025-07-17
Attending: EMERGENCY MEDICINE
Payer: COMMERCIAL

## 2025-07-17 ENCOUNTER — APPOINTMENT (OUTPATIENT)
Dept: RADIOLOGY | Facility: MEDICAL CENTER | Age: 35
End: 2025-07-17
Attending: EMERGENCY MEDICINE
Payer: COMMERCIAL

## 2025-07-17 VITALS
TEMPERATURE: 98.2 F | WEIGHT: 280.2 LBS | OXYGEN SATURATION: 100 % | SYSTOLIC BLOOD PRESSURE: 101 MMHG | BODY MASS INDEX: 34.84 KG/M2 | HEART RATE: 74 BPM | HEIGHT: 75 IN | RESPIRATION RATE: 16 BRPM | DIASTOLIC BLOOD PRESSURE: 56 MMHG

## 2025-07-17 DIAGNOSIS — R10.9 FLANK PAIN: ICD-10-CM

## 2025-07-17 DIAGNOSIS — R11.2 NAUSEA AND VOMITING, UNSPECIFIED VOMITING TYPE: ICD-10-CM

## 2025-07-17 DIAGNOSIS — N20.0 KIDNEY STONE: Primary | ICD-10-CM

## 2025-07-17 DIAGNOSIS — R10.31 RIGHT LOWER QUADRANT ABDOMINAL PAIN: ICD-10-CM

## 2025-07-17 LAB
ALBUMIN SERPL BCP-MCNC: 4.6 G/DL (ref 3.2–4.9)
ALBUMIN/GLOB SERPL: 1.5 G/DL
ALP SERPL-CCNC: 70 U/L (ref 30–99)
ALT SERPL-CCNC: 30 U/L (ref 2–50)
ANION GAP SERPL CALC-SCNC: 13 MMOL/L (ref 7–16)
APPEARANCE UR: CLEAR
AST SERPL-CCNC: 24 U/L (ref 12–45)
BASOPHILS # BLD AUTO: 0.4 % (ref 0–1.8)
BASOPHILS # BLD: 0.03 K/UL (ref 0–0.12)
BILIRUB SERPL-MCNC: 0.4 MG/DL (ref 0.1–1.5)
BILIRUB UR QL STRIP.AUTO: NEGATIVE
BUN SERPL-MCNC: 13 MG/DL (ref 8–22)
CALCIUM ALBUM COR SERPL-MCNC: 8.6 MG/DL (ref 8.5–10.5)
CALCIUM SERPL-MCNC: 9.1 MG/DL (ref 8.5–10.5)
CHLORIDE SERPL-SCNC: 101 MMOL/L (ref 96–112)
CO2 SERPL-SCNC: 21 MMOL/L (ref 20–33)
COLOR UR: YELLOW
CREAT SERPL-MCNC: 0.97 MG/DL (ref 0.5–1.4)
EOSINOPHIL # BLD AUTO: 0.03 K/UL (ref 0–0.51)
EOSINOPHIL NFR BLD: 0.4 % (ref 0–6.9)
ERYTHROCYTE [DISTWIDTH] IN BLOOD BY AUTOMATED COUNT: 45.6 FL (ref 35.9–50)
GFR SERPLBLD CREATININE-BSD FMLA CKD-EPI: 105 ML/MIN/1.73 M 2
GLOBULIN SER CALC-MCNC: 3.1 G/DL (ref 1.9–3.5)
GLUCOSE SERPL-MCNC: 106 MG/DL (ref 65–99)
GLUCOSE UR STRIP.AUTO-MCNC: NEGATIVE MG/DL
HCT VFR BLD AUTO: 41.2 % (ref 42–52)
HGB BLD-MCNC: 14 G/DL (ref 14–18)
IMM GRANULOCYTES # BLD AUTO: 0.03 K/UL (ref 0–0.11)
IMM GRANULOCYTES NFR BLD AUTO: 0.4 % (ref 0–0.9)
KETONES UR STRIP.AUTO-MCNC: ABNORMAL MG/DL
LEUKOCYTE ESTERASE UR QL STRIP.AUTO: NEGATIVE
LIPASE SERPL-CCNC: 24 U/L (ref 11–82)
LYMPHOCYTES # BLD AUTO: 0.93 K/UL (ref 1–4.8)
LYMPHOCYTES NFR BLD: 12 % (ref 22–41)
MCH RBC QN AUTO: 31 PG (ref 27–33)
MCHC RBC AUTO-ENTMCNC: 34 G/DL (ref 32.3–36.5)
MCV RBC AUTO: 91.2 FL (ref 81.4–97.8)
MICRO URNS: ABNORMAL
MONOCYTES # BLD AUTO: 0.34 K/UL (ref 0–0.85)
MONOCYTES NFR BLD AUTO: 4.4 % (ref 0–13.4)
NEUTROPHILS # BLD AUTO: 6.37 K/UL (ref 1.82–7.42)
NEUTROPHILS NFR BLD: 82.4 % (ref 44–72)
NITRITE UR QL STRIP.AUTO: NEGATIVE
NRBC # BLD AUTO: 0 K/UL
NRBC BLD-RTO: 0 /100 WBC (ref 0–0.2)
PH UR STRIP.AUTO: 5.5 [PH] (ref 5–8)
PLATELET # BLD AUTO: 235 K/UL (ref 164–446)
PMV BLD AUTO: 10.2 FL (ref 9–12.9)
POTASSIUM SERPL-SCNC: 4 MMOL/L (ref 3.6–5.5)
PROT SERPL-MCNC: 7.7 G/DL (ref 6–8.2)
PROT UR QL STRIP: NEGATIVE MG/DL
RBC # BLD AUTO: 4.52 M/UL (ref 4.7–6.1)
RBC UR QL AUTO: NEGATIVE
SODIUM SERPL-SCNC: 135 MMOL/L (ref 135–145)
SP GR UR STRIP.AUTO: 1.01
UROBILINOGEN UR STRIP.AUTO-MCNC: 0.2 EU/DL
WBC # BLD AUTO: 7.7 K/UL (ref 4.8–10.8)

## 2025-07-17 PROCEDURE — 700111 HCHG RX REV CODE 636 W/ 250 OVERRIDE (IP): Mod: JZ | Performed by: EMERGENCY MEDICINE

## 2025-07-17 PROCEDURE — 81003 URINALYSIS AUTO W/O SCOPE: CPT

## 2025-07-17 PROCEDURE — 96374 THER/PROPH/DIAG INJ IV PUSH: CPT

## 2025-07-17 PROCEDURE — 94760 N-INVAS EAR/PLS OXIMETRY 1: CPT

## 2025-07-17 PROCEDURE — 96361 HYDRATE IV INFUSION ADD-ON: CPT

## 2025-07-17 PROCEDURE — 99285 EMERGENCY DEPT VISIT HI MDM: CPT

## 2025-07-17 PROCEDURE — 700105 HCHG RX REV CODE 258: Performed by: EMERGENCY MEDICINE

## 2025-07-17 PROCEDURE — 96375 TX/PRO/DX INJ NEW DRUG ADDON: CPT

## 2025-07-17 PROCEDURE — 74176 CT ABD & PELVIS W/O CONTRAST: CPT

## 2025-07-17 PROCEDURE — 36415 COLL VENOUS BLD VENIPUNCTURE: CPT

## 2025-07-17 PROCEDURE — A9270 NON-COVERED ITEM OR SERVICE: HCPCS | Performed by: EMERGENCY MEDICINE

## 2025-07-17 PROCEDURE — 85025 COMPLETE CBC W/AUTO DIFF WBC: CPT

## 2025-07-17 PROCEDURE — 700102 HCHG RX REV CODE 250 W/ 637 OVERRIDE(OP): Performed by: EMERGENCY MEDICINE

## 2025-07-17 PROCEDURE — 80053 COMPREHEN METABOLIC PANEL: CPT

## 2025-07-17 PROCEDURE — 83690 ASSAY OF LIPASE: CPT

## 2025-07-17 RX ORDER — TAMSULOSIN HYDROCHLORIDE 0.4 MG/1
0.4 CAPSULE ORAL DAILY
Qty: 10 CAPSULE | Refills: 0 | Status: SHIPPED | OUTPATIENT
Start: 2025-07-17 | End: 2025-07-27

## 2025-07-17 RX ORDER — OXYCODONE HYDROCHLORIDE 5 MG/1
5 TABLET ORAL EVERY 4 HOURS PRN
Qty: 30 TABLET | Refills: 0 | Status: SHIPPED | OUTPATIENT
Start: 2025-07-17 | End: 2025-07-29

## 2025-07-17 RX ORDER — MORPHINE SULFATE 4 MG/ML
4 INJECTION INTRAVENOUS ONCE
Status: COMPLETED | OUTPATIENT
Start: 2025-07-17 | End: 2025-07-17

## 2025-07-17 RX ORDER — TAMSULOSIN HYDROCHLORIDE 0.4 MG/1
0.4 CAPSULE ORAL ONCE
Status: COMPLETED | OUTPATIENT
Start: 2025-07-17 | End: 2025-07-17

## 2025-07-17 RX ORDER — ONDANSETRON 2 MG/ML
4 INJECTION INTRAMUSCULAR; INTRAVENOUS ONCE
Status: COMPLETED | OUTPATIENT
Start: 2025-07-17 | End: 2025-07-17

## 2025-07-17 RX ORDER — SODIUM CHLORIDE, SODIUM LACTATE, POTASSIUM CHLORIDE, AND CALCIUM CHLORIDE .6; .31; .03; .02 G/100ML; G/100ML; G/100ML; G/100ML
1000 INJECTION, SOLUTION INTRAVENOUS ONCE
Status: COMPLETED | OUTPATIENT
Start: 2025-07-17 | End: 2025-07-17

## 2025-07-17 RX ORDER — KETOROLAC TROMETHAMINE 15 MG/ML
15 INJECTION, SOLUTION INTRAMUSCULAR; INTRAVENOUS ONCE
Status: COMPLETED | OUTPATIENT
Start: 2025-07-17 | End: 2025-07-17

## 2025-07-17 RX ADMIN — ONDANSETRON 4 MG: 2 INJECTION INTRAMUSCULAR; INTRAVENOUS at 02:08

## 2025-07-17 RX ADMIN — MORPHINE SULFATE 4 MG: 4 INJECTION INTRAVENOUS at 02:08

## 2025-07-17 RX ADMIN — KETOROLAC TROMETHAMINE 15 MG: 15 INJECTION, SOLUTION INTRAMUSCULAR; INTRAVENOUS at 02:36

## 2025-07-17 RX ADMIN — SODIUM CHLORIDE, POTASSIUM CHLORIDE, SODIUM LACTATE AND CALCIUM CHLORIDE 1000 ML: 600; 310; 30; 20 INJECTION, SOLUTION INTRAVENOUS at 02:09

## 2025-07-17 RX ADMIN — TAMSULOSIN HYDROCHLORIDE 0.4 MG: 0.4 CAPSULE ORAL at 02:36

## 2025-07-17 ASSESSMENT — PAIN DESCRIPTION - PAIN TYPE
TYPE: ACUTE PAIN
TYPE: ACUTE PAIN

## 2025-07-17 NOTE — ED PROVIDER NOTES
"ED Provider Note    CHIEF COMPLAINT  Chief Complaint   Patient presents with    Nausea/Vomiting/Diarrhea     Pt walks in with family at his side. Pt c/o N/V started 2 hours ago with stomach pain.       EXTERNAL RECORDS REVIEWED  Other No recent ED visits or hospitalizations to review.    HPI/ROS  LIMITATION TO HISTORY   Select: : None  OUTSIDE HISTORIAN(S):  None    Ector Rodriguez is a 34 y.o. male with no reported medical history who presents to the emergency department for evaluation of right sided flank pain that wraps around to the right lower quadrant that started a few hours prior to arrival.  Pain came on suddenly.  No fevers or chills.  He has been nauseous and vomiting.  No diarrhea, constipation, bloody stools, melena.  No bloody urine or pain with urination.  No history of kidney stones.  No abdominal surgeries.    PAST MEDICAL HISTORY   No past medical history.    SURGICAL HISTORY  patient denies any surgical history    FAMILY HISTORY  Family History   Problem Relation Age of Onset    Breast Cancer Maternal Aunt     Ovarian Cancer Neg Hx     Tubal Cancer Neg Hx     Colorectal Cancer Neg Hx     Peritoneal Cancer Neg Hx        SOCIAL HISTORY  Social History     Tobacco Use    Smoking status: Never    Smokeless tobacco: Never   Vaping Use    Vaping status: Never Used   Substance and Sexual Activity    Alcohol use: Yes     Alcohol/week: 3.6 oz     Types: 6 Cans of beer per week     Comment: occ    Drug use: No    Sexual activity: Not Currently       CURRENT MEDICATIONS  Home Medications       Reviewed by Shanika Carroll R.N. (Registered Nurse) on 07/17/25 at 0020  Med List Status: Not Addressed     Medication Last Dose Status   meloxicam (MOBIC) 15 MG tablet  Active                    ALLERGIES  Allergies[1]    PHYSICAL EXAM  VITAL SIGNS: /56   Pulse 74   Temp 36.8 °C (98.2 °F) (Temporal)   Resp 16   Ht 1.905 m (6' 3\")   Wt (!) 127 kg (280 lb 3.3 oz)   SpO2 100%   BMI 35.02 kg/m²  "   General: Well-appearing, no acute distress.   Eyes: No scleral icterus. EOM grossly intact BL.  HENT: Oropharynx clear, uvula midline, symmetric tonsils without exudates.   Neck: Normal ROM. No cervical lymphadenopathy or swelling. No midline cervical spine tenderness.   Lungs: Non-labored breathing. Clear to auscultation bilaterally. No wheezing or crackles.  Cardiac: Regular rate and rhythm. No murmurs. No lower extremity swelling. Equal and symmetric distal pulses. Well-perfused.  Abdomen: Soft, non-distended. Right lower quadrant tenderness. No rebound or guarding.  Right sided CVA tenderness.   MSK: No joint swelling or erythema. Symmetric movement of all extremities.  Skin: No rashes, lesions, bruising, or petechiae. Well-perfused.   Neuro: Grossly nonfocal neurologic exam. Face symmetric. Normal mentation.     EKG/LABS  CBC - no leukocytosis.    RADIOLOGY/PROCEDURES   I have independently interpreted the diagnostic imaging associated with this visit and am waiting the final reading from the radiologist.     My preliminary interpretation is as follows:   CT renal study: Right sided distal ureter stone..     Radiologist interpretation:  CT-RENAL COLIC EVALUATION (A/P W/O)   Final Result      1.  A 3 x 2 mm right UVJ stone results in mild right hydronephrosis.   2.  No other renal stones.   3.  Hepatic steatosis.          COURSE & MEDICAL DECISION MAKING    ASSESSMENT, COURSE AND PLAN  Care Narrative:   Ector Rodriguez is a 34 y.o. male with no reported medical history who presents to the emergency department for evaluation of right sided flank pain that wraps around to the right lower quadrant that started a few hours prior to arrival.     0140: On my initial assessment, ABCs are intact.  He is hemodynamically stable and afebrile.  He appears somewhat uncomfortable, but nontoxic.  He has slight right lower quadrant tenderness, but no peritoneal signs.  He also has right-sided CVA tenderness.  I think he  has a kidney stone.  IV access established and labs were obtained.  He received Toradol, morphine, Zofran, Flomax, and a fluid bolus.    0240: CT renal study showed a distal 3mm right UVJ stone.  CBC showed no leukocytosis.  CMP showed normal renal function.  Urinalysis without evidence of infection.    0330: On my reassessment, he reports significant improvement in his symptoms.  I reviewed the results with the patient.  I explained the stone is small enough that it would likely pass on its own, and it is close to the UVJ, so hopefully will pass on its own.  I referred him to urology.  I prescribed Flomax and oxycodone.  I recommended Tylenol and ibuprofen first-line for pain control.  Strict return precautions were reviewed, all of his questions were answered, and he was discharged in stable condition.    Hydration: Based on the patient's presentation of Acute Vomiting the patient was given IV fluids. IV Hydration was used because oral hydration was not adequate alone. Upon recheck following hydration, the patient was improved.    Narcotics Script: In prescribing controlled substances to this patient, I certify that I have obtained and reviewed the medical history of Ector Rodriguez. I have also made a good cory effort to obtain applicable records from other providers who have treated the patient and records did not demonstrate any increased risk of substance abuse that would prevent me from prescribing controlled substances.     I have conducted a physical exam and documented it. I have reviewed Mr. Rodriguez’s prescription history as maintained by the [place] Prescription Monitoring Program.     I have assessed the patient’s risk for abuse, dependency, and addiction using the validated Opioid Risk Tool available at https://www.mdcalc.com/kkwntu-yllw-ojjt-ort-narcotic-abuse.     Given the above, I believe the benefits of controlled substance therapy outweigh the risks. The reasons for prescribing controlled  substances include non-narcotic, oral analgesic alternatives have been inadequate for pain control. Accordingly, I have discussed the risk and benefits, treatment plan, and alternative therapies with the patient.     ADDITIONAL PROBLEMS MANAGED  N/A    DISPOSITION AND DISCUSSIONS  I have discussed management of the patient with the following physicians and GERALD's:  None    Discussion of management with other [facility] or appropriate source(s): None     Escalation of care considered, and ultimately not performed:acute inpatient care management, however at this time, the patient is most appropriate for outpatient management    Barriers to care at this time, including but not limited to: None.     Decision tools and prescription drugs considered including, but not limited to: Oxycodone, Flomax.    FINAL DIAGNOSIS  1. Kidney stone Acute   2. Flank pain Acute   3. Nausea and vomiting, unspecified vomiting type Acute   4. Right lower quadrant abdominal pain Acute        Electronically signed by: Bettina Fan D.O., 7/17/2025 1:38 AM           [1] No Known Allergies

## 2025-07-17 NOTE — DISCHARGE INSTRUCTIONS
You can take Tylenol and ibuprofen together at the same time every 6 hours first-line for pain control.  You can take oxycodone 1 tablet every 6 hours for severe breakthrough pain.  Tamsulosin/Flomax will help relax your ureter and passed the stone more easily.  You can take 1 tablet daily for the next 10 days.    I placed a referral for urology.    If you develop any worsening pain, fever, chills, burning or pain with urination, please return to the emergency department to be reevaluated.

## 2025-07-17 NOTE — ED TRIAGE NOTES
"Chief Complaint   Patient presents with    Nausea/Vomiting/Diarrhea     Pt walks in with family at his side. Pt c/o N/V started 2 hours ago with stomach pain.     BP (!) 167/94   Pulse 84   Temp 36.8 °C (98.2 °F) (Temporal)   Resp 16   Ht 1.905 m (6' 3\")   Wt (!) 127 kg (280 lb 3.3 oz)   SpO2 97%   BMI 35.02 kg/m²     "

## 2025-07-22 NOTE — Clinical Note
REFERRAL APPROVAL NOTICE         Sent on July 22, 2025                   Ector Rodriguez  [address] [place] [address]                   Dear Mr. Rodriguez,    After a careful review of the medical information and benefit coverage, [facility] has processed your referral. See below for additional details.    If applicable, you must be actively enrolled with your insurance for coverage of the authorized service. If you have any questions regarding your coverage, please contact your insurance directly.    REFERRAL INFORMATION   Referral #:  [id number]  Referred-To Provider    Referred-By Provider:  Urology    Bettina Fan D.O.   UROLOGY [address] Emergency Room  [address]  [place] [address]  [phone] [address]  [place] [address]  [phone]    Referral Start Date:  07/17/2025  Referral End Date:   07/17/2026             SCHEDULING  If you do not already have an appointment, please call [phone] to make an appointment.     MORE INFORMATION  If you do not already have a [employer] account, sign up at: [email or web address]  You can access your medical information, make appointments, see lab results, billing information, and more.  If you have questions regarding this referral, please contact  the [facility] Referrals department at:             [phone]. Monday - Friday 8:00AM - 5:00PM.     Sincerely,    [facility] [place]

## 2025-07-22 NOTE — Clinical Note
Member Name: Ector Rodriguez   Member Number: [id number]   Reference Number: [id number]   Approved Services: Consultation   Approved Service Dates: 07/17/2025 - 07/17/2026   Requesting Provider: Bettina Fan   Requested Provider: Sophia Alvarez     Dear Ector Rodriguez:     The following medical service(s) requested by Bettina Fan have been approved:    Procedure Code Procedure Code Name Requested Quantity Approved Quantity Status   56222 (CPT®) PA OFFICE/OUTPATIENT NEW MODERATE MDM 45 MINUTES 1 1 Authorized   67450 (CPT®) PA OFFICE/OUTPATIENT ESTABLISHED MOD MDM 30 MIN 5 5 Authorized       Approved Quantity means the number of visits approved for medication treatments and/or medical services.    The services should be provided by Sophia Alvarez no later than 07/17/2026. Please contact the provider listed below with any questions. Your plan benefit may require a deductible, co-payment, or co-insurance for these services.    Provider Information:  Sophia Alvarez  [phone]    Important Note for Members:    This authorization does not guarantee that [facility] or [facility] will pay for your care. Payment depends on your plan details, if you're eligible for coverage on the day you receive care, and whether the service follows plan rules. These include things such as reviewing if the service is medically necessary. We recommend reviewing your Evidence of Coverage before receiving any care.    Important Note for Providers:     Payment by [facility] or [employer] for these services is subject to the terms of the Evidence of Coverage or Summary Plan Description, eligibility at the time of service, standard processing procedures and confirmation of benefit coverage. All claims are subject to standard processing procedures, including but not limited to coding edits, medical necessity determinations, and other plan policies in effect at the time of claim adjudication. Providers are  required to follow all [facility] Administrative Guidelines and requirements.    For any questions or additional information, please contact Customer Service:    [facility]  Customer Service: [phone] or toll free [phone]  TTY users dial: [phone]   Call Center Hours: Mon - Fri 7 AM to 8 PM PST   Office Hours: Mon - Fri 8 AM to 5 PM PST   E-mail: [email or web address]   Website: [email or web address]       This information is available for free in other languages. Please contact Customer Service at the phone number above for more information. [facility] complies with applicable Federal civil rights laws and does not discriminate on the basis of race, color, national origin, age, disability or sex.      Sincerely,     Healthcare Utilization Management Department     Cc: UrologEvelia Fan